# Patient Record
Sex: MALE | Race: WHITE | NOT HISPANIC OR LATINO | Employment: FULL TIME | URBAN - METROPOLITAN AREA
[De-identification: names, ages, dates, MRNs, and addresses within clinical notes are randomized per-mention and may not be internally consistent; named-entity substitution may affect disease eponyms.]

---

## 2017-04-07 ENCOUNTER — OFFICE VISIT (OUTPATIENT)
Dept: URGENT CARE | Facility: PHYSICIAN GROUP | Age: 32
End: 2017-04-07
Payer: COMMERCIAL

## 2017-04-07 VITALS
HEART RATE: 92 BPM | WEIGHT: 280 LBS | BODY MASS INDEX: 43.95 KG/M2 | SYSTOLIC BLOOD PRESSURE: 112 MMHG | TEMPERATURE: 98.1 F | RESPIRATION RATE: 16 BRPM | DIASTOLIC BLOOD PRESSURE: 78 MMHG | OXYGEN SATURATION: 96 % | HEIGHT: 67 IN

## 2017-04-07 DIAGNOSIS — L60.0 INGROWN LEFT GREATER TOENAIL: ICD-10-CM

## 2017-04-07 PROCEDURE — 11730 AVULSION NAIL PLATE SIMPLE 1: CPT | Performed by: EMERGENCY MEDICINE

## 2017-04-07 RX ORDER — HYDROCODONE BITARTRATE AND ACETAMINOPHEN 5; 325 MG/1; MG/1
1 TABLET ORAL EVERY 6 HOURS PRN
Qty: 6 TAB | Refills: 0 | Status: SHIPPED | OUTPATIENT
Start: 2017-04-07

## 2017-04-07 ASSESSMENT — ENCOUNTER SYMPTOMS
SPEECH CHANGE: 0
VOMITING: 0
FEVER: 0
MYALGIAS: 0
ABDOMINAL PAIN: 0
SWOLLEN GLANDS: 0
EYE REDNESS: 0
CHILLS: 0
EYE DISCHARGE: 0

## 2017-04-07 NOTE — Clinical Note
April 7, 2017         Patient: Lamine Batista   YOB: 1985   Date of Visit: 4/7/2017           To Whom it May Concern:    Lamine Batista was seen in my clinic on 4/7/2017.  Please excuse from work for 1-2 days, for medical reasons..    If you have any questions or concerns, please don't hesitate to call.        Sincerely,           QUIANA Mcclure M.D.  Electronically Signed

## 2019-07-11 NOTE — PROGRESS NOTES
Subjective:      Lamine Batista is a 31 y.o. male who presents with Toe Pain            Foot Problem  This is a new problem. Episode onset: patient has had a ingrown left toenail for the past 2 days. States there is purulent drainage adjacent to the nail.. He feels he cut it wrong CLIPPING his toenails. The problem occurs constantly. The problem has been gradually worsening. Pertinent negatives include no abdominal pain, chest pain, chills, fever, myalgias, rash, swollen glands or vomiting.     Allergies   Allergen Reactions   • Amoxicillin    • Sulfa Drugs      Social History     Social History   • Marital Status: Single     Spouse Name: N/A   • Number of Children: N/A   • Years of Education: N/A     Occupational History   • Not on file.     Social History Main Topics   • Smoking status: Never Smoker    • Smokeless tobacco: Not on file   • Alcohol Use: No   • Drug Use: No   • Sexual Activity: Not on file     Other Topics Concern   • Not on file     Social History Narrative     Past Medical History   Diagnosis Date   • Asthma      Current Outpatient Prescriptions on File Prior to Visit   Medication Sig Dispense Refill   • azithromycin (ZITHROMAX) 250 MG Tab uad 6 Tab 0   • guaifenesin-codeine (CHERATUSSIN AC) Solution oral solution Take 5 mL by mouth every four hours as needed for Cough. 200 mL 0   • cyclobenzaprine (FLEXERIL) 10 MG TABS Take 1 Tab by mouth 3 times a day as needed for Mild Pain. 30 Tab 0   • PROAIR  (90 BASE) MCG/ACT AERS Inhale 2 Puffs by mouth every four hours as needed for Shortness of Breath ( chest tighness). 1 Inhaler 0   • albuterol (VENTOLIN OR PROVENTIL) 108 (90 BASE) MCG/ACT AERS Inhale 2 Puffs by mouth every 6 hours as needed (for cough). 1 Inhaler 3     No current facility-administered medications on file prior to visit.   No family history on file.    Review of Systems   Constitutional: Negative for fever and chills.   Eyes: Negative for discharge and redness.   Cardiovascular:  "Negative for chest pain.   Gastrointestinal: Negative for vomiting and abdominal pain.   Genitourinary: Negative.    Musculoskeletal: Negative for myalgias and joint pain.   Skin: Negative for rash.        Patient has cellulitis and inflammatory reaction adjacent to the nail on his left great toenail. On the medial aspect primarily. No lymphangitis.   Neurological: Negative for speech change.   Endo/Heme/Allergies: Environmental allergies: S.          Objective:     /78 mmHg  Pulse 92  Temp(Src) 36.7 °C (98.1 °F)  Resp 16  Ht 1.702 m (5' 7.01\")  Wt 127.007 kg (280 lb)  BMI 43.84 kg/m2  SpO2 96%     Physical Exam   Constitutional: He appears well-developed and well-nourished. No distress.   HENT:   Head: Atraumatic.   Left Ear: External ear normal.   Eyes: Conjunctivae are normal.   Cardiovascular: Normal rate.    Pulmonary/Chest: Effort normal.   Musculoskeletal: He exhibits edema and tenderness.   Left foot patient has inflammation of his left great toe with ingrown nail on the medial aspect. No obvious purulence but patient had purulence earlier.   Neurological: He is alert.   Skin: He is not diaphoretic. No erythema.   Psychiatric: He has a normal mood and affect.   Vitals reviewed.           Procedure: I explained the procedure to the patient and he agreed. His toes clean Betadine was apply 1% Xylocaine digital and local locks were placed. The nail was teased off without difficulty and a Polysporin Telfa Coban dressing was placed.       Assessment/Plan:     1. Ingrown left greater toenail      Patient was given a prescription for Bactroban to apply once to twice a day soaking his toe for 15-20 minutes each time. He was given a note that he may or may not be dependent upon difficulty with the anesthesia wearing off. He knows the nail will take 4-6 weeks to regrow. He will have a prescription for Norco should he need it but I recommended the use of over-the-counter anti-inflammatories.  - mupirocin " (BACTROBAN) 2 % Ointment; Apply 1 Application to affected area(s) 2 times a day.  Dispense: 1 Tube; Refill: 1  - hydrocodone-acetaminophen (NORCO) 5-325 MG Tab per tablet; Take 1 Tab by mouth every 6 hours as needed.  Dispense: 6 Tab; Refill: 0         Pancreatic cancer

## 2024-06-15 ENCOUNTER — OFFICE VISIT (OUTPATIENT)
Dept: URGENT CARE | Facility: CLINIC | Age: 39
End: 2024-06-15
Payer: COMMERCIAL

## 2024-06-15 VITALS
SYSTOLIC BLOOD PRESSURE: 138 MMHG | TEMPERATURE: 98.5 F | HEART RATE: 88 BPM | OXYGEN SATURATION: 97 % | RESPIRATION RATE: 12 BRPM | BODY MASS INDEX: 39.55 KG/M2 | WEIGHT: 252 LBS | DIASTOLIC BLOOD PRESSURE: 86 MMHG | HEIGHT: 67 IN

## 2024-06-15 DIAGNOSIS — J02.9 PHARYNGITIS, UNSPECIFIED ETIOLOGY: ICD-10-CM

## 2024-06-15 LAB — S PYO DNA SPEC NAA+PROBE: NOT DETECTED

## 2024-06-15 PROCEDURE — 87651 STREP A DNA AMP PROBE: CPT | Performed by: NURSE PRACTITIONER

## 2024-06-15 PROCEDURE — 3075F SYST BP GE 130 - 139MM HG: CPT | Performed by: NURSE PRACTITIONER

## 2024-06-15 PROCEDURE — 3079F DIAST BP 80-89 MM HG: CPT | Performed by: NURSE PRACTITIONER

## 2024-06-15 PROCEDURE — 99203 OFFICE O/P NEW LOW 30 MIN: CPT | Performed by: NURSE PRACTITIONER

## 2024-06-15 RX ORDER — LIDOCAINE HYDROCHLORIDE 20 MG/ML
15 SOLUTION OROPHARYNGEAL
Qty: 100 ML | Refills: 0 | Status: SHIPPED | OUTPATIENT
Start: 2024-06-15

## 2024-06-15 ASSESSMENT — ENCOUNTER SYMPTOMS
FEVER: 1
SINUS PAIN: 0
HEADACHES: 0
RESPIRATORY NEGATIVE: 1
SHORTNESS OF BREATH: 0
SORE THROAT: 1
COUGH: 0

## 2024-06-15 ASSESSMENT — VISUAL ACUITY: OU: 1

## 2024-06-16 ENCOUNTER — TELEPHONE (OUTPATIENT)
Dept: URGENT CARE | Facility: CLINIC | Age: 39
End: 2024-06-16
Payer: COMMERCIAL

## 2024-06-16 NOTE — TELEPHONE ENCOUNTER
Received message from SHABBIR to call patient.    I called patient and spoke with him over the phone.  He reports spitting up mucus with small tinges of blood.  I advised that this could likely be related to irritation of the throat.  He denies other symptoms at this time such as cough.  Advised supportive measures and staying hydrated.  He reports he will go to the pharmacy to  his viscous lidocaine.  Advised to continue with ibuprofen and acetaminophen.  Monitor symptoms.  If not improved in 2 days, return for reevaluation, or sooner if symptoms change or worsen.  All questions answered.

## 2024-06-20 ASSESSMENT — ENCOUNTER SYMPTOMS: CHILLS: 1

## 2025-01-12 ENCOUNTER — OFFICE VISIT (OUTPATIENT)
Dept: URGENT CARE | Facility: PHYSICIAN GROUP | Age: 40
End: 2025-01-12
Payer: COMMERCIAL

## 2025-01-12 VITALS
DIASTOLIC BLOOD PRESSURE: 82 MMHG | WEIGHT: 250.99 LBS | TEMPERATURE: 97.3 F | HEART RATE: 103 BPM | BODY MASS INDEX: 39.39 KG/M2 | OXYGEN SATURATION: 96 % | RESPIRATION RATE: 12 BRPM | HEIGHT: 67 IN | SYSTOLIC BLOOD PRESSURE: 122 MMHG

## 2025-01-12 DIAGNOSIS — H10.33 ACUTE BACTERIAL CONJUNCTIVITIS OF BOTH EYES: ICD-10-CM

## 2025-01-12 DIAGNOSIS — H93.11 TINNITUS OF RIGHT EAR: ICD-10-CM

## 2025-01-12 PROCEDURE — 3074F SYST BP LT 130 MM HG: CPT | Performed by: STUDENT IN AN ORGANIZED HEALTH CARE EDUCATION/TRAINING PROGRAM

## 2025-01-12 PROCEDURE — 3079F DIAST BP 80-89 MM HG: CPT | Performed by: STUDENT IN AN ORGANIZED HEALTH CARE EDUCATION/TRAINING PROGRAM

## 2025-01-12 PROCEDURE — 99213 OFFICE O/P EST LOW 20 MIN: CPT | Performed by: STUDENT IN AN ORGANIZED HEALTH CARE EDUCATION/TRAINING PROGRAM

## 2025-01-12 RX ORDER — MOXIFLOXACIN 5 MG/ML
1 SOLUTION/ DROPS OPHTHALMIC 3 TIMES DAILY
Qty: 2 ML | Refills: 0 | Status: SHIPPED | OUTPATIENT
Start: 2025-01-12 | End: 2025-01-19

## 2025-01-12 NOTE — PROGRESS NOTES
"Subjective:   Lamine Batista is a 39 y.o. male who presents for Pink Eye (Bilateral gooey red eyes x 1 day. Drainage, mainly right eye.)      HPI:  39-year-old male presents to urgent care for 1 day of bilateral eye discharge worse on the right than on the left.  No blurred vision or double vision.  No fever, chills, nasal congestion, sore throat, cough.  Does report some tinnitus to the right ear but no consistent ear pain.  States he did have tinnitus in the past which was believed to be due to Wellbutrin and he did discontinue that medication and the tinnitus did resolve.  He has not started any new medications recently.  Denies any trauma to the area.        Medications:    albuterol Aers  lidocaine Soln  moxifloxacin Soln  ProAir HFA Aers    Allergies: Amoxicillin and Sulfa drugs    Problem List: Lamine Batista does not have a problem list on file.    Surgical History:  Past Surgical History:   Procedure Laterality Date    OTHER      non descended testiscle    TONSILLECTOMY         Past Social Hx: Lamine Batista  reports that he has never smoked. He does not have any smokeless tobacco history on file. He reports that he does not drink alcohol and does not use drugs.     Past Family Hx:  Lamine Batista family history is not on file.     Problem list, medications, and allergies reviewed by myself today in Epic.     Objective:     /82 (BP Location: Left arm, Patient Position: Sitting, BP Cuff Size: Adult)   Pulse (!) 103   Temp 36.3 °C (97.3 °F) (Temporal)   Resp 12   Ht 1.702 m (5' 7\")   Wt 114 kg (250 lb 15.9 oz)   SpO2 96%   BMI 39.31 kg/m²     Physical Exam  Vitals reviewed.   HENT:      Right Ear: Tympanic membrane, ear canal and external ear normal. Decreased hearing noted. No middle ear effusion. Tympanic membrane is not perforated, erythematous or bulging.      Left Ear: Tympanic membrane, ear canal and external ear normal.      Nose: No congestion or rhinorrhea.      Mouth/Throat:      " Mouth: Mucous membranes are moist.      Pharynx: No posterior oropharyngeal erythema.   Eyes:      General:         Right eye: Discharge present. No foreign body or hordeolum.         Left eye: Discharge present.No foreign body or hordeolum.      Conjunctiva/sclera: Conjunctivae normal.      Right eye: Right conjunctiva is not injected.      Left eye: Left conjunctiva is not injected.      Pupils: Pupils are equal, round, and reactive to light.   Cardiovascular:      Rate and Rhythm: Normal rate and regular rhythm.      Pulses: Normal pulses.      Heart sounds: Normal heart sounds. No murmur heard.  Pulmonary:      Effort: Pulmonary effort is normal. No respiratory distress.      Breath sounds: Normal breath sounds. No stridor. No wheezing, rhonchi or rales.   Musculoskeletal:      Cervical back: Normal range of motion.   Lymphadenopathy:      Cervical: No cervical adenopathy.         Assessment/Plan:     Diagnosis and associated orders:     1. Acute bacterial conjunctivitis of both eyes  moxifloxacin (VIGAMOX) 0.5 % Solution      2. Tinnitus of right ear           Comments/MDM:     Patient's presentation physical exam findings are consistent with acute bacterial conjunctivitis.  Patient was started on moxifloxacin eyedrops.  No signs of preseptal or orbital cellulitis.  Presentation not consistent with bacterial sinusitis.  Patient does have tinnitus to the right ear which she has had in the past.  Examination of the ear shows no abnormal findings.  No signs of otitis media or otitis externa.  No signs of shingles or rash.  No mass noted in the ear canal or on the eardrum.  Reviewed patient's medications without any with known side effect of tinnitus.  Offered ENT referral but patient feels comfortable following up with his PCP for this.         Differential diagnosis, natural history, supportive care, and indications for immediate follow-up discussed.    Advised the patient to follow-up with the primary care  physician for recheck, reevaluation, and consideration of further management.    Please note that this dictation was created using voice recognition software. I have made a reasonable attempt to correct obvious errors, but I expect that there are errors of grammar and possibly content that I did not discover before finalizing the note.    Electronically signed by Jose Carlos Hui PA-C.

## 2025-08-03 ENCOUNTER — OFFICE VISIT (OUTPATIENT)
Dept: URGENT CARE | Facility: PHYSICIAN GROUP | Age: 40
End: 2025-08-03
Payer: COMMERCIAL

## 2025-08-03 VITALS
OXYGEN SATURATION: 98 % | BODY MASS INDEX: 41.04 KG/M2 | WEIGHT: 261.47 LBS | DIASTOLIC BLOOD PRESSURE: 72 MMHG | SYSTOLIC BLOOD PRESSURE: 108 MMHG | HEART RATE: 83 BPM | TEMPERATURE: 97.9 F | HEIGHT: 67 IN | RESPIRATION RATE: 20 BRPM

## 2025-08-03 DIAGNOSIS — B02.9 HERPES ZOSTER WITHOUT COMPLICATION: Primary | ICD-10-CM

## 2025-08-03 PROCEDURE — 3078F DIAST BP <80 MM HG: CPT

## 2025-08-03 PROCEDURE — 3074F SYST BP LT 130 MM HG: CPT

## 2025-08-03 PROCEDURE — 99213 OFFICE O/P EST LOW 20 MIN: CPT

## 2025-08-03 RX ORDER — VALACYCLOVIR HYDROCHLORIDE 1 G/1
1000 TABLET, FILM COATED ORAL 3 TIMES DAILY
Qty: 21 TABLET | Refills: 0 | Status: SHIPPED | OUTPATIENT
Start: 2025-08-03 | End: 2025-08-10

## 2025-08-03 ASSESSMENT — ENCOUNTER SYMPTOMS: FEVER: 0

## 2025-08-16 ENCOUNTER — SUPERVISING PHYSICIAN REVIEW (OUTPATIENT)
Dept: URGENT CARE | Facility: PHYSICIAN GROUP | Age: 40
End: 2025-08-16
Payer: COMMERCIAL